# Patient Record
Sex: MALE | Race: WHITE | Employment: OTHER | URBAN - METROPOLITAN AREA
[De-identification: names, ages, dates, MRNs, and addresses within clinical notes are randomized per-mention and may not be internally consistent; named-entity substitution may affect disease eponyms.]

---

## 2018-10-24 ENCOUNTER — HOSPITAL ENCOUNTER (OUTPATIENT)
Dept: WOUND CARE | Age: 73
Discharge: HOME | End: 2018-10-24
Payer: MEDICARE

## 2018-10-24 ENCOUNTER — HOSPITAL ENCOUNTER (OUTPATIENT)
Dept: LAB | Age: 73
Discharge: HOME | End: 2018-10-24
Payer: MEDICARE

## 2018-10-24 VITALS
SYSTOLIC BLOOD PRESSURE: 170 MMHG | HEIGHT: 76 IN | WEIGHT: 200 LBS | RESPIRATION RATE: 16 BRPM | HEART RATE: 70 BPM | TEMPERATURE: 98.5 F | DIASTOLIC BLOOD PRESSURE: 72 MMHG | BODY MASS INDEX: 24.36 KG/M2

## 2018-10-24 PROCEDURE — 99204 OFFICE O/P NEW MOD 45 MIN: CPT

## 2018-10-24 PROCEDURE — 74011000250 HC RX REV CODE- 250: Performed by: PODIATRIST

## 2018-10-24 PROCEDURE — 11100 HC DEBRID INFECTD SKIN UPTO 10%: CPT

## 2018-10-24 PROCEDURE — 88304 TISSUE EXAM BY PATHOLOGIST: CPT

## 2018-10-24 RX ORDER — MULTIVITAMIN WITH IRON
1 TABLET ORAL DAILY
COMMUNITY

## 2018-10-24 RX ORDER — METFORMIN HYDROCHLORIDE 750 MG/1
750 TABLET, EXTENDED RELEASE ORAL DAILY
COMMUNITY

## 2018-10-24 RX ORDER — LIDOCAINE HYDROCHLORIDE 20 MG/ML
JELLY TOPICAL ONCE
Status: COMPLETED | OUTPATIENT
Start: 2018-10-24 | End: 2018-10-24

## 2018-10-24 RX ADMIN — LIDOCAINE HYDROCHLORIDE: 20 JELLY TOPICAL at 08:55

## 2018-10-24 NOTE — WOUND CARE
Rachel Arriaga, DPM - [facility] - H&P Assessment/Plan: 
- Pt evaluated and treated. - RT heel unknown skin lesion excised for biopsy, see note below, there was some serous drainage that came out as well, about 5 cc. No sarah s/s of infection and BG has been WNL so will closely monitor, and depending on course may require workup for osteomyelitis. - RT 3rd toe has pre ulcerative lesion associated with hammertoe, noted he may want to consider surgically correcting this issue to help prevent further episodes of ulceration and could help prevent amputation. 
- Aquacel Ag to heel with gauze, shoe modified with felt cut out. 
- RT 3rd toe callus debrided as well as RT SM 1. 
- F/U 1 week. Subjective: 
Pt complains of wound to RT heel and possibly RT 3rd toe. Previous tx include neosporin. Negative for fever, chills, nausea, vomiting, chest pain, shortness of breath. HPI: 74 y.o. Male presents to [facility] c/o ulcer to RT heel and RT 3rd toe. Issue began about 5-6 weeks ago when he put salicylic acid on the RT heel to treat a verruca. Site became inflamed and ulcerated and painful, and he has been putting neosporin on it. Also notes dark hard spot on RT 3rd toe, he thinks he has had ulcers there in the past as well. History: 
right foot Allergies Allergen Reactions  Pcn [Penicillins] Other (comments) Per patient, when she was young and got infected, he received a lot of penicillin. So MD told to stay away from penicillin and use other atbx if possible. However patient stated that he did not have allergic reaction to it. History reviewed. No pertinent family history. Past Medical History:  
Diagnosis Date  Diabetes ([address]  Gastrointestinal disorder GERD  Other ill-defined conditions(519.89) SOB seen by cardiologist and stress test performed 1 week ago  Pancreatitis  Recovering alcoholic in remission ([facility] [address] Past Surgical History:  
Procedure Laterality Date  HX CHOLECYSTECTOMY  HX HEENT    
 tonsillectomy  HX ORTHOPAEDIC Social History Tobacco Use  Smoking status: Former Smoker Last attempt to quit: 10/24/2015 Years since quitting: 3.0  Smokeless tobacco: Never Used Substance Use Topics  Alcohol use: No  
   
Social History Substance and Sexual Activity Alcohol Use No  
 
Social History Substance and Sexual Activity Drug Use No  
  
Social History Tobacco Use Smoking Status Former Smoker  Last attempt to quit: 10/24/2015  Years since quitting: 3.0 Smokeless Tobacco Never Used Current Outpatient Medications Medication Sig  
 metFORMIN ER (GLUCOPHAGE XR) 750 mg tablet Take 750 mg by mouth daily.  aspirin-caffeine (ABRAHAM BACK AND BODY) 500-32.5 mg tab Take  by mouth.  multivitamin with iron tablet Take 1 Tab by mouth daily.  meperidine (DEMEROL) 50 mg tablet Take 1-2 Tabs by mouth every four (4) hours as needed for Pain.  omeprazole (PRILOSEC) 20 mg capsule Take 20 mg by mouth daily. No current facility-administered medications for this encounter. Objective: 
Visit Vitals /72 (BP 1 Location: Right arm, BP Patient Position: At rest;Sitting) Pulse 70 Temp 98.5 °F (36.9 °C) Resp 16 Ht 6' 3.5\" (1.918 m) Wt 90.7 kg (200 lb) BMI 24.67 kg/m² Vascular: B/L LE 
DP 1/4; PT 0/4 
capillary fill time brisk, pitting edema is present, skin temperature is cool, varicosities are present. Dermatological: 
Nails are thickened, elongated, discolored, painful to palpation, 2mm thick, with subungual debris. There are extensive skin cracks at both heels. Skin is dry and scaly, exhibits hemosiderin deposition. There is no maceration of the interspaces of the feet b/l.   Hyperkeratotic lesions with dried blood at RT 3rd toe and RT SM 1. Wound: 1 Location: RT plantar heel Measurements: per RN note Margins: no erythema / edema, keratotic Drainage: moderate serous Odor: none Wound base: granular with central well circumscribed lesion Lymphangitic streaking? No. 
Undermining? No. 
Sinus tracts? No. 
Exposed bone? No. 
Subcutaneous crepitation on palpation? No. 
 
Neurological: DTR are present, protective sensation per 5.07 H[place] Rebecca monofilament is absent, patient is AAOx3, mood is normal. Epicritic sensation is intact. Orthopedic: B/L LE are symmetric, ROM of ankle, STJ, 1st MTPJ is limited, MMT 5 out of 5 for B/L LE. All lesser toes exhibit semi-rigid digital contractures and mild abducted hallux at b/l 1st MTPJs Constitutional: Pt is a well developed, average elderly  male. Lymphatics: negative tenderness to palpation of neck/axillary/inguinal nodes. Imaging / Labs / Cx / Px: 
Biopsy pending. Biopsy Procedure Note Biopsy Type: excisional 
Indication: non-healing chronic ulcer resistant to normal tx modalities, R/O malignant skin lesion Anesthesia:  5 cc 2% lidocaine with epinephrine Instrumentation: #15, scissors, pickup EBL: minimal 
Hemostasis: pressure / silver nitrate (superficial only) Description of procedure: After obtaining verbal consent and verifying standing written consent, site identified by myself and confirmed with the patient. Local anesthetic was infiltrated in a field block. Site prepped with betadine. #15 utilized to free the circumscribed lesion from the surrounding tissues, and then scissors / pickup used to excise lesion, which extended about 3-4 mm into the heel. Margins then gently curetted. Lesion deep but good osseous coverage of calcaneus. Only superficial margin at excision site cauterized with silver nitrate, none was utilized deep in the excision site. Lesion will be sent for pathological assessment. Satisfied with excision of the lesion site cleansed with NSS and then dsg applied as noted above. Pt tolerated the procedure well.

## 2018-10-24 NOTE — WOUND CARE
10/24/18 2818 Wound Heel Right Date First Assessed/Time First Assessed: 10/24/18 0834   POA: Yes  Wound Type: Diabetic  Location: Heel  Orientation: Right Wound Length (cm) 0.9 cm Wound Width (cm) 1.5 cm Wound Depth (cm) 0.1 Wound Surface area (cm^2) 1.35 cm^2 Condition of Base Pink Condition of Edges Calloused Wound Odor None Periwound Skin Condition Erythema, blanchable;Edematous;Calloused Cleansing and Cleansing Agents  Normal saline Visit Vitals /72 (BP 1 Location: Right arm, BP Patient Position: At rest;Sitting) Pulse 70 Temp 98.5 °F (36.9 °C) Resp 16 Ht 6' 3.5\" (1.918 m) Wt 90.7 kg (200 lb) BMI 24.67 kg/m²

## 2018-10-30 ENCOUNTER — TELEPHONE (OUTPATIENT)
Dept: WOUND CARE | Age: 73
End: 2018-10-30

## 2018-10-31 ENCOUNTER — HOSPITAL ENCOUNTER (OUTPATIENT)
Dept: WOUND CARE | Age: 73
Discharge: HOME | End: 2018-10-31
Payer: MEDICARE

## 2018-10-31 VITALS
TEMPERATURE: 98.5 F | SYSTOLIC BLOOD PRESSURE: 184 MMHG | RESPIRATION RATE: 16 BRPM | HEART RATE: 74 BPM | DIASTOLIC BLOOD PRESSURE: 82 MMHG

## 2018-10-31 PROCEDURE — 74011000250 HC RX REV CODE- 250: Performed by: PODIATRIST

## 2018-10-31 PROCEDURE — 11042 DBRDMT SUBQ TIS 1ST 20SQCM/<: CPT

## 2018-10-31 RX ORDER — LIDOCAINE 40 MG/G
CREAM TOPICAL
Status: COMPLETED | OUTPATIENT
Start: 2018-10-31 | End: 2018-10-31

## 2018-10-31 RX ORDER — TRAMADOL HYDROCHLORIDE 50 MG/1
50 TABLET ORAL
COMMUNITY

## 2018-10-31 RX ADMIN — LIDOCAINE: 4 CREAM TOPICAL at 08:28

## 2018-10-31 NOTE — WOUND CARE
Sujatha Ortez, ZENA - [facility] Assessment/Plan: 
- Pt evaluated and treated. - Ulceration and LE edema is worsening, will initiate TCC along with endoform and hydrofera blue, foam.  Pt will return later this week for first cast change. Biopsy site has filled in, no further unusual drainage. Px was non specific inflamed tissue. 
- No sarah s/s of infection and BG has been WNL so will closely monitor, and depending on course may require workup for osteomyelitis. - F/U 1 week. Subjective: 
Pt complains of wound to RT heel. Previous tx include local care with Aquacel Ag, neosporin. Negative for fever, chills, nausea, vomiting, chest pain, shortness of breath. Noted that a few days ago his RT knee was bothering him so he put a heating pad on it, then subsequently fell asleep, and when he woke the knee was even more painful, which has changed the way he walks, notes he has been favoring one side or the other to his RT heel as it is intermittently painful along with now the RT knee. HPI: 74 y.o. Male presents to [facility] c/o ulcer to RT heel and RT 3rd toe. Issue began about 5-6 weeks ago when he put salicylic acid on the RT heel to treat a verruca. Site became inflamed and ulcerated and painful, and he has been putting neosporin on it. Also notes dark hard spot on RT 3rd toe, he thinks he has had ulcers there in the past as well. History: 
right foot Allergies Allergen Reactions  Pcn [Penicillins] Other (comments) Per patient, when she was young and got infected, he received a lot of penicillin. So MD told to stay away from penicillin and use other atbx if possible. However patient stated that he did not have allergic reaction to it. No family history on file. Past Medical History:  
Diagnosis Date  Diabetes ([address]  Gastrointestinal disorder GERD  Other ill-defined conditions(799.89) SOB seen by cardiologist and stress test performed 1 week ago  Pancreatitis  Recovering alcoholic in remission ([facility] [address] Past Surgical History:  
Procedure Laterality Date  HX CHOLECYSTECTOMY  HX HEENT    
 tonsillectomy  HX ORTHOPAEDIC Social History Tobacco Use  Smoking status: Former Smoker Last attempt to quit: 10/24/2015 Years since quitting: 3.0  Smokeless tobacco: Never Used Substance Use Topics  Alcohol use: No  
   
Social History Substance and Sexual Activity Alcohol Use No  
 
Social History Substance and Sexual Activity Drug Use No  
  
Social History Tobacco Use Smoking Status Former Smoker  Last attempt to quit: 10/24/2015  Years since quitting: 3.0 Smokeless Tobacco Never Used Current Outpatient Medications Medication Sig  
 traMADol (ULTRAM) 50 mg tablet Take 50 mg by mouth every six (6) hours as needed for Pain.  metFORMIN ER (GLUCOPHAGE XR) 750 mg tablet Take 750 mg by mouth daily.  aspirin-caffeine (ABRAHAM BACK AND BODY) 500-32.5 mg tab Take  by mouth.  multivitamin with iron tablet Take 1 Tab by mouth daily.  meperidine (DEMEROL) 50 mg tablet Take 1-2 Tabs by mouth every four (4) hours as needed for Pain.  omeprazole (PRILOSEC) 20 mg capsule Take 20 mg by mouth daily. No current facility-administered medications for this encounter. Objective: 
Visit Vitals /82 Pulse 74 Temp 98.5 °F (36.9 °C) Resp 16 Vascular: B/L LE 
DP 1/4; PT 0/4 
capillary fill time brisk, pitting edema is present, skin temperature is cool, varicosities are present. Dermatological: 
Nails are thickened, elongated, discolored, painful to palpation, 2mm thick, with subungual debris. There are extensive skin cracks at both heels. Skin is dry and scaly, exhibits hemosiderin deposition. There is no maceration of the interspaces of the feet b/l. Debrided hyperkeratotic lesions with dried blood at RT 3rd toe and RT SM 1. Wound: 1 Location: RT plantar heel Measurements: per RN note Margins: no erythema / edema, keratotic Drainage: moderate serous Odor: none Wound base: granular Lymphangitic streaking? No. 
Undermining? No. 
Sinus tracts? No. 
Exposed bone? No. 
Subcutaneous crepitation on palpation? No. 
 
Neurological: DTR are present, protective sensation per 5.07 [place] Rebecca monofilament is absent, patient is AAOx3, mood is normal. Epicritic sensation is intact. Orthopedic: B/L LE are symmetric, ROM of ankle, STJ, 1st MTPJ is limited, MMT 5 out of 5 for B/L LE. All lesser toes exhibit semi-rigid digital contractures and mild abducted hallux at b/l 1st MTPJs Constitutional: Pt is a well developed, average elderly  male. Lymphatics: negative tenderness to palpation of neck/axillary/inguinal nodes. Procedure Note: 
Excisional debridement through level of fat. Location / Ulcer: RT heel Indication: to remove non-viable tissue from wound bed. Consent in chart. Anesthesia: topical lidocaine Instrument: scissors / pickup Residual necrosis: none Bleeding: minimal 
Hemostasis: pressure Pre-Procedure Pain: 0 Post-Procedure Pain: 0 Area debrided < 20 cm sq. Pre-Debridement measurements: see nursing notes Post-Debridement measurements: see nursing notes This is part of a series of staged procedures in an attempt at limb salvage. Imaging / Labs / Cx / Px: Px: non specific inflammatory tissue

## 2018-10-31 NOTE — WOUND CARE
10/31/18 0820 Wound Heel Right Date First Assessed/Time First Assessed: 10/24/18 0853   POA: Yes  Wound Type: Diabetic  Location: Heel  Orientation: Right DRESSING STATUS Clean, dry, and intact DRESSING TYPE Aquacel;Dry dressing Wound Length (cm) 1.5 cm Wound Width (cm) 2 cm Wound Depth (cm) 0.1 Wound Surface area (cm^2) 3 cm^2 Change in Wound Size % -122.22 Condition of Base Pink;Slough Condition of Edges Calloused Wound Odor None Periwound Skin Condition Intact Cleansing and Cleansing Agents  Normal saline Visit Vitals /82 Pulse 74 Temp 98.5 °F (36.9 °C) Resp 16

## 2018-10-31 NOTE — WOUND CARE
10/31/18 0820 Wound Heel Right Date First Assessed/Time First Assessed: 10/24/18 0853   POA: Yes  Wound Type: Diabetic  Location: Heel  Orientation: Right DRESSING STATUS Clean, dry, and intact DRESSING TYPE Aquacel;Dry dressing Wound Length (cm) 1.5 cm Wound Width (cm) 2 cm Wound Depth (cm) 0.1 Wound Surface area (cm^2) 3 cm^2 Change in Wound Size % -122.22 Condition of Base Pink;Slough Condition of Edges Calloused Wound Odor None Periwound Skin Condition Intact Cleansing and Cleansing Agents  Normal saline Wound Procedure Type Debridement- Surgical  
Consent Obtained  Yes Procedure Anesthia  yes Procedure Instrument scissors forcep Procedure Bleeding Yes Hemostasis yes Procedure Pain Scale Numeric 0/10 Post-Procedure Length (cm) 2 cm Post-Procedure Width (cm) 2.1 cm Post-Procedure Depth (cm) 0.5 cm Post-Procedure Volume (cm^3) 2.1 cm^3 Post-Procedure Surface Area (cm^2) 4.2 cm^2 Procedure Tolerated Well Visit Vitals /82 Pulse 74 Temp 98.5 °F (36.9 °C) Resp 16

## 2018-11-02 ENCOUNTER — TELEPHONE (OUTPATIENT)
Dept: WOUND CARE | Age: 73
End: 2018-11-02

## 2018-11-05 ENCOUNTER — APPOINTMENT (OUTPATIENT)
Dept: WOUND CARE | Age: 73
End: 2018-11-05

## 2018-11-05 ENCOUNTER — TELEPHONE (OUTPATIENT)
Dept: WOUND CARE | Age: 73
End: 2018-11-05
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
## 2018-11-07 ENCOUNTER — HOSPITAL ENCOUNTER (OUTPATIENT)
Dept: WOUND CARE | Age: 73
Discharge: HOME | End: 2018-11-07
Payer: MEDICARE

## 2018-11-07 VITALS
TEMPERATURE: 98.7 F | DIASTOLIC BLOOD PRESSURE: 80 MMHG | OXYGEN SATURATION: 95 % | HEART RATE: 80 BPM | RESPIRATION RATE: 16 BRPM | SYSTOLIC BLOOD PRESSURE: 176 MMHG

## 2018-11-07 PROCEDURE — 11042 DBRDMT SUBQ TIS 1ST 20SQCM/<: CPT
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
## 2018-11-07 NOTE — WOUND CARE
11/07/18 1010 Wound Heel Right Date First Assessed/Time First Assessed: 10/24/18 0853   POA: Yes  Wound Type: Diabetic  Location: Heel  Orientation: Right DRESSING STATUS Breakthrough drainage;Removed DRESSING TYPE Foam 
(TCC-EZ) Wound Length (cm) 2 cm Wound Width (cm) 2.5 cm Wound Depth (cm) 0.2 Wound Surface area (cm^2) 5 cm^2 Change in Wound Size % -270.37 Condition of Base Pink;Slough Condition of Edges Calloused (Macerated ) Wound Toe (specify in comments) Right Date First Assessed/Time First Assessed: 11/07/18 1016   POA: Yes  Wound Type: Diabetic  Location: Toe (specify in comments)  Wound Description (Optional): Great Toe   Orientation: Right DRESSING STATUS Breakthrough drainage;Removed DRESSING TYPE (TCC-EZ) Wound Length (cm) 1 cm Wound Width (cm) 1 cm Wound Depth (cm) 0.2 Wound Surface area (cm^2) 1 cm^2 Condition of Base Pink;Slough;Eschar Drainage Amount  Small Drainage Color Tan Wound Odor Mild Periwound Skin Condition Macerated;Calloused Cleansing and Cleansing Agents  Chlorhexidine gluconate 4%

## 2018-11-07 NOTE — PROGRESS NOTES
Problem: Diabetic Ulcer-Treatment Goal: *Improvement of existing diabetic ulcer Outcome: Progressing Towards Goal 
Callus pairing.

## 2018-11-07 NOTE — WOUND CARE
Visit Vitals /80 (BP 1 Location: Right arm, BP Patient Position: Sitting) Pulse 80 Temp 98.7 °F (37.1 °C) Resp 16 SpO2 95% 11/07/18 1010 Wound Heel Right Date First Assessed/Time First Assessed: 10/24/18 0853   POA: Yes  Wound Type: Diabetic  Location: Heel  Orientation: Right DRESSING STATUS Breakthrough drainage;Removed DRESSING TYPE Foam 
(TCC-EZ) Wound Length (cm) 2 cm Wound Width (cm) 2.5 cm Wound Depth (cm) 0.2 Wound Surface area (cm^2) 5 cm^2 Change in Wound Size % -270.37 Condition of Base Pink;Slough Condition of Edges Calloused (Macerated ) Wound Toe (specify in comments) Right Date First Assessed/Time First Assessed: 11/07/18 1016   POA: Yes  Wound Type: Diabetic  Location: Toe (specify in comments)  Wound Description (Optional): Great Toe   Orientation: Right DRESSING STATUS Breakthrough drainage;Removed DRESSING TYPE (TCC-EZ) Wound Length (cm) 1 cm Wound Width (cm) 1 cm Wound Depth (cm) 0.2 Wound Surface area (cm^2) 1 cm^2 Condition of Base Pink;Slough;Eschar Drainage Amount  Small Drainage Color Tan Wound Odor Mild Periwound Skin Condition Macerated;Calloused Cleansing and Cleansing Agents  Chlorhexidine gluconate 4%

## 2018-11-14 ENCOUNTER — HOSPITAL ENCOUNTER (OUTPATIENT)
Dept: WOUND CARE | Age: 73
Discharge: HOME | End: 2018-11-14
Payer: MEDICARE

## 2018-11-14 VITALS
TEMPERATURE: 98.2 F | SYSTOLIC BLOOD PRESSURE: 187 MMHG | HEART RATE: 71 BPM | DIASTOLIC BLOOD PRESSURE: 81 MMHG | RESPIRATION RATE: 16 BRPM

## 2018-11-14 PROCEDURE — 11042 DBRDMT SUBQ TIS 1ST 20SQCM/<: CPT
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
## 2018-11-14 NOTE — WOUND CARE
Visit Vitals /81 (BP 1 Location: Right arm, BP Patient Position: Sitting) Pulse 71 Temp 98.2 °F (36.8 °C) Resp 16  
 
 
 11/14/18 1003 Wound Toe (specify in comments) Right Date First Assessed/Time First Assessed: 11/07/18 1016   POA: Yes  Wound Type: Diabetic  Location: Toe (specify in comments)  Wound Description (Optional): Great Toe   Orientation: Right DRESSING STATUS Clean, dry, and intact DRESSING TYPE Alginate Wound Length (cm) 0.7 cm Wound Width (cm) 0.7 cm Wound Depth (cm) 0.2 Wound Surface area (cm^2) 0.49 cm^2 Change in Wound Size % 51 Condition of Base [place];Slough Drainage Amount  Small Drainage Color Tan Wound Odor None Periwound Skin Condition Calloused Wound Heel Right Date First Assessed/Time First Assessed: 10/24/18 0853   POA: Yes  Wound Type: Diabetic  Location: Heel  Orientation: Right DRESSING STATUS Clean, dry, and intact DRESSING TYPE Alginate Wound Length (cm) 2 cm Wound Width (cm) 2 cm Wound Depth (cm) 0.2 Wound Surface area (cm^2) 4 cm^2 Change in Wound Size % -196.3 Condition of Base Pink Condition of Edges Calloused Drainage Amount  Small Drainage Color Serosanguinous Wound Odor None Periwound Skin Condition Calloused

## 2018-11-14 NOTE — PROGRESS NOTES
Problem: Diabetic Ulcer-Treatment Goal: *Improvement of existing diabetic ulcer Outcome: Progressing Towards Goal 
Debridement performed.

## 2018-11-30 ENCOUNTER — TELEPHONE (OUTPATIENT)
Dept: WOUND CARE | Age: 73
End: 2018-11-30